# Patient Record
Sex: FEMALE | Race: WHITE | NOT HISPANIC OR LATINO | Employment: OTHER | RURAL
[De-identification: names, ages, dates, MRNs, and addresses within clinical notes are randomized per-mention and may not be internally consistent; named-entity substitution may affect disease eponyms.]

---

## 2020-11-18 ENCOUNTER — HISTORICAL (OUTPATIENT)
Dept: ADMINISTRATIVE | Facility: HOSPITAL | Age: 85
End: 2020-11-18

## 2022-05-23 ENCOUNTER — HOSPITAL ENCOUNTER (OUTPATIENT)
Dept: RADIOLOGY | Facility: HOSPITAL | Age: 87
Discharge: HOME | End: 2022-05-23
Attending: RADIOLOGY
Payer: MEDICARE

## 2022-05-23 ENCOUNTER — HOSPITAL ENCOUNTER (OUTPATIENT)
Dept: RADIOLOGY | Facility: HOSPITAL | Age: 87
Discharge: HOME | End: 2022-05-23
Payer: MEDICARE

## 2022-05-23 DIAGNOSIS — R92.8 ABNORMAL MAMMOGRAM: ICD-10-CM

## 2022-05-23 DIAGNOSIS — Z12.31 VISIT FOR SCREENING MAMMOGRAM: ICD-10-CM

## 2022-05-23 PROCEDURE — 77067 SCR MAMMO BI INCL CAD: CPT | Mod: 26,,, | Performed by: RADIOLOGY

## 2022-05-23 PROCEDURE — 76641 US BREAST BILATERAL COMPLETE: ICD-10-PCS | Mod: 26,50,, | Performed by: RADIOLOGY

## 2022-05-23 PROCEDURE — 76641 ULTRASOUND BREAST COMPLETE: CPT | Mod: 26,50,, | Performed by: RADIOLOGY

## 2022-05-23 PROCEDURE — 76641 ULTRASOUND BREAST COMPLETE: CPT | Mod: TC,50

## 2022-05-23 PROCEDURE — 77067 SCR MAMMO BI INCL CAD: CPT | Mod: TC

## 2022-05-23 PROCEDURE — 77067 MAMMO DIGITAL SCREENING BILAT: ICD-10-PCS | Mod: 26,,, | Performed by: RADIOLOGY

## 2023-07-11 ENCOUNTER — HOSPITAL ENCOUNTER (OUTPATIENT)
Dept: RADIOLOGY | Facility: HOSPITAL | Age: 88
Discharge: HOME | End: 2023-07-11
Payer: MEDICARE

## 2023-07-11 DIAGNOSIS — Z12.31 VISIT FOR SCREENING MAMMOGRAM: ICD-10-CM

## 2023-07-11 PROCEDURE — 77067 MAMMO DIGITAL SCREENING BILAT: ICD-10-PCS | Mod: 26,,, | Performed by: RADIOLOGY

## 2023-07-11 PROCEDURE — 77067 SCR MAMMO BI INCL CAD: CPT | Mod: TC

## 2023-07-11 PROCEDURE — 77067 SCR MAMMO BI INCL CAD: CPT | Mod: 26,,, | Performed by: RADIOLOGY

## 2024-09-03 ENCOUNTER — HOSPITAL ENCOUNTER (OUTPATIENT)
Dept: RADIOLOGY | Facility: HOSPITAL | Age: 89
Discharge: HOME | End: 2024-09-03
Attending: RADIOLOGY
Payer: MEDICARE

## 2024-09-03 DIAGNOSIS — Z12.31 VISIT FOR SCREENING MAMMOGRAM: ICD-10-CM

## 2024-09-03 PROCEDURE — 77067 SCR MAMMO BI INCL CAD: CPT | Mod: TC

## 2024-09-03 PROCEDURE — 77067 SCR MAMMO BI INCL CAD: CPT | Mod: 26,,, | Performed by: RADIOLOGY

## 2024-09-03 PROCEDURE — 77063 BREAST TOMOSYNTHESIS BI: CPT | Mod: 26,,, | Performed by: RADIOLOGY

## 2025-06-04 ENCOUNTER — OFFICE VISIT (OUTPATIENT)
Dept: FAMILY MEDICINE | Facility: CLINIC | Age: OVER 89
End: 2025-06-04
Payer: MEDICARE

## 2025-06-04 VITALS
HEART RATE: 78 BPM | HEIGHT: 65 IN | OXYGEN SATURATION: 97 % | DIASTOLIC BLOOD PRESSURE: 77 MMHG | SYSTOLIC BLOOD PRESSURE: 166 MMHG | RESPIRATION RATE: 20 BRPM | TEMPERATURE: 98 F | WEIGHT: 110 LBS | BODY MASS INDEX: 18.33 KG/M2

## 2025-06-04 DIAGNOSIS — G62.9 PERIPHERAL POLYNEUROPATHY: Primary | ICD-10-CM

## 2025-06-04 RX ORDER — DULOXETIN HYDROCHLORIDE 30 MG/1
30 CAPSULE, DELAYED RELEASE ORAL DAILY
COMMUNITY

## 2025-06-04 RX ORDER — PREGABALIN 50 MG/1
50 CAPSULE ORAL 2 TIMES DAILY
Qty: 60 CAPSULE | Refills: 1 | Status: SHIPPED | OUTPATIENT
Start: 2025-06-04

## 2025-06-04 RX ORDER — LEVOTHYROXINE SODIUM 50 UG/1
50 TABLET ORAL
COMMUNITY

## 2025-07-14 ENCOUNTER — TELEPHONE (OUTPATIENT)
Dept: NEUROLOGY | Facility: CLINIC | Age: OVER 89
End: 2025-07-14
Payer: MEDICARE

## 2025-07-14 NOTE — TELEPHONE ENCOUNTER
Copied from CRM #[id number]. Topic: Appointments - Appointment Access  >> Jul 14, 2025  9:42 AM Denia wrote:  Who Called: Leticia Figueroa    Caller is requesting a sooner appointment.       When is the first available appointment? 8/27 at 2:45  Options offered (Virtual Visit, Urgent Care): n/a  Symptoms: neuropathy in both feet but L is worse      Preferred Method of Contact: Phone Call  Patient's Preferred Phone Number on File: [phone]

## 2025-07-17 ENCOUNTER — OFFICE VISIT (OUTPATIENT)
Dept: NEUROLOGY | Facility: CLINIC | Age: OVER 89
End: 2025-07-17
Payer: MEDICARE

## 2025-07-17 VITALS
HEIGHT: 65 IN | OXYGEN SATURATION: 98 % | RESPIRATION RATE: 18 BRPM | WEIGHT: 104 LBS | HEART RATE: 97 BPM | BODY MASS INDEX: 17.33 KG/M2 | DIASTOLIC BLOOD PRESSURE: 78 MMHG | SYSTOLIC BLOOD PRESSURE: 124 MMHG

## 2025-07-17 DIAGNOSIS — G60.3 IDIOPATHIC PROGRESSIVE NEUROPATHY: ICD-10-CM

## 2025-07-17 DIAGNOSIS — G62.9 NEUROPATHY: Primary | ICD-10-CM

## 2025-07-17 PROCEDURE — 99204 OFFICE O/P NEW MOD 45 MIN: CPT | Mod: S$PBB,,, | Performed by: PSYCHIATRY & NEUROLOGY

## 2025-07-17 PROCEDURE — 99999 PR PBB SHADOW E&M-EST. PATIENT-LVL IV: CPT | Mod: PBBFAC,,, | Performed by: PSYCHIATRY & NEUROLOGY

## 2025-07-17 PROCEDURE — 99214 OFFICE O/P EST MOD 30 MIN: CPT | Mod: PBBFAC | Performed by: PSYCHIATRY & NEUROLOGY

## 2025-07-17 RX ORDER — VIT A/VIT C/VIT E/ZINC/COPPER 2148-113
TABLET ORAL
COMMUNITY

## 2025-07-17 RX ORDER — CETIRIZINE HYDROCHLORIDE 10 MG/1
TABLET ORAL
COMMUNITY
Start: 2023-06-19

## 2025-07-17 RX ORDER — MULTIVITAMIN
1 TABLET ORAL
COMMUNITY

## 2025-07-17 NOTE — PROGRESS NOTES
HPI/Subjective/ROS      History of Present Illness    CHIEF COMPLAINT:  Patient, a 89-year-old female, presents to neurology for evaluation of neuropathy symptoms in her feet.    HPI:  Patient reports neuropathy symptoms in her feet, describing numbness, tingling, and burning sensations consistent with paresthesia. These symptoms developed relatively recently, after nine decades without them. The neuropathy is primarily localized to her feet, with possible involvement in her hands.    She has tried several medications for her neuropathy symptoms without adequate relief. Gabapentin caused side effects including dizziness and unsteadiness. Cymbalta (duloxetine) caused significant psychological distress. She also tried Lyrica (pregabalin), though the effects were not clearly stated.    She maintains an active lifestyle and has always been very ambulatory. She has not noticed any difference in symptoms with the use of a multivitamin, which is the only supplement she regularly takes.    A previous B12 test, conducted approximately 7 months ago, showed levels that were twice the upper limit of normal, though she denies intentionally supplementing with B12 beyond her multivitamin.    She denies having these neuropathy symptoms for most of her life, being diabetic, or having had chemotherapy for cancer.    TEST RESULTS:  Patient's Vitamin B12 level was tested 7 months ago in December, with results twice the upper limit of normal. A more recent test from 2 months ago was mentioned to be normal, but this information was later noted as uncertain. Patient underwent a peripheral vascular test to rule out clots.      ROS:  General: -fever, -chills, -fatigue, -weight gain, -weight loss  Eyes: -vision changes, -redness, -discharge  ENT: -ear pain, -nasal congestion, -sore throat  Cardiovascular: -chest pain, -palpitations, -lower extremity edema  Respiratory: -cough, -shortness of breath  Gastrointestinal: -abdominal pain,  "-nausea, -vomiting, -diarrhea, -constipation, -blood in stool  Genitourinary: -dysuria, -hematuria, -frequency  Musculoskeletal: -joint pain, -muscle pain  Skin: -rash, -lesion  Neurological: -headache, -dizziness, +numbness, +tingling, +burning sensation  Psychiatric: -anxiety, -depression, -sleep difficulty          Allergies:  Carbamazepine, Doxycycline, and Gabapentin    Current Medications:  Encounter Medications[1]    Past Medical History: History reviewed. No pertinent past medical history.    Surgical History: History reviewed. No pertinent surgical history.    Social History  Ms. Figueroa  reports that she has never smoked. She has never used smokeless tobacco. She reports that she does not drink alcohol and does not use drugs.    Family History  Ms.'s Figueroa family history includes Breast cancer in her sister.      Objective:   /78 (BP Location: Right arm, Patient Position: Sitting)   Pulse 97   Resp 18   Ht 5' 5" (1.651 m)   Wt 47.2 kg (104 lb)   SpO2 98%   BMI 17.31 kg/m²          Physical Exam    General: No acute distress. Well-developed. Well-nourished.  Eyes: EOMI. Sclerae anicteric.  HENT: Normocephalic. Atraumatic. Nares patent. Moist oral mucosa.  Ears: Bilateral TMs clear. Bilateral EACs clear.  Cardiovascular: Regular rate. Regular rhythm. No murmurs. No rubs. No gallops. Normal S1, S2.  Respiratory: Normal respiratory effort. Clear to auscultation bilaterally. No rales. No rhonchi. No wheezing.  Abdomen: Soft. Non-tender. Non-distended. Normoactive bowel sounds.  Musculoskeletal: No  obvious deformity.  Extremities: No lower extremity edema.  Neurological: Alert & oriented x3. No slurred speech. Normal gait.  Psychiatric: Normal mood. Normal affect. Good insight. Good judgment.  Skin: Warm. Dry. No rash. Normal pink toenails.          Assessment:     Assessment & Plan    IMPRESSION:  - Symptoms consistent with neuropathy, primarily affecting the feet with symptoms like numbness, " tingling, and burning sensations.  - Standard treatments for neuropathy including Cymbalta (duloxetine), Lyrica (pregabalin), and Gabapentin discontinued due to ineffectiveness and side effects.  - Ruled out common causes of neuropathy: diabetes, chemotherapy, low B12 levels.  - Considered idiopathic neuropathy as possible diagnosis given age and lack of other risk factors.    ABNORMAL BLOOD CHEMISTRY:  - Clarified that excessive B12 supplementation can lead to abnormally high levels.  - Ordered B12 level to rule out recent deficiency, despite previous high levels.  - Continued multivitamin.    GENERAL HEALTH MANAGEMENT:  - Patient to consider using a stationary bike for physical activity.          Primary Diagnosis and ICD10  Neuropathy [G62.9]    Plan:     There are no Patient Instructions on file for this visit.    Medications Discontinued During This Encounter   Medication Reason    pregabalin (LYRICA) 50 MG capsule     DULoxetine (CYMBALTA) 30 MG capsule        Requested Prescriptions      No prescriptions requested or ordered in this encounter       This note was generated with the assistance of ambient listening technology. Verbal consent was obtained by the patient and accompanying visitor(s) for the recording of patient appointment to facilitate this note. I attest to having reviewed and edited the generated note for accuracy, though some syntax or spelling errors may persist. Please contact the author of this note for any clarification.            [1]   Outpatient Encounter Medications as of 7/17/2025   Medication Sig Dispense Refill    cetirizine (ZYRTEC) 10 MG tablet Cetirizine HCl 10 MG Oral Tablet QTY: 0 tablet Days: 0 Refills: 0  Written: 06/19/23 Patient Instructions:      levothyroxine (SYNTHROID) 50 MCG tablet Take 50 mcg by mouth before breakfast.      multivitamin (THERAGRAN) per tablet Take 1 tablet by mouth.      vitamins A,C,E-zinc-copper (PRESERVISION AREDS) 2,148 mcg-113 mg-45 mg-17.4mg Tab  as directed Orally twice a day      [DISCONTINUED] DULoxetine (CYMBALTA) 30 MG capsule Take 30 mg by mouth once daily.      [DISCONTINUED] pregabalin (LYRICA) 50 MG capsule Take 1 capsule (50 mg total) by mouth 2 (two) times daily. 60 capsule 1     No facility-administered encounter medications on file as of 7/17/2025.